# Patient Record
Sex: MALE
[De-identification: names, ages, dates, MRNs, and addresses within clinical notes are randomized per-mention and may not be internally consistent; named-entity substitution may affect disease eponyms.]

---

## 2019-07-09 ENCOUNTER — TRANSCRIPTION ENCOUNTER (OUTPATIENT)
Age: 49
End: 2019-07-09

## 2022-07-12 ENCOUNTER — APPOINTMENT (OUTPATIENT)
Dept: ORTHOPEDIC SURGERY | Facility: CLINIC | Age: 52
End: 2022-07-12

## 2022-07-12 VITALS — WEIGHT: 265 LBS | HEIGHT: 70 IN | BODY MASS INDEX: 37.94 KG/M2

## 2022-07-12 DIAGNOSIS — S62.337D DISPLACED FRACTURE OF NECK OF FIFTH METACARPAL BONE, LEFT HAND, SUBSEQUENT ENCOUNTER FOR FRACTURE WITH ROUTINE HEALING: ICD-10-CM

## 2022-07-12 PROBLEM — Z00.00 ENCOUNTER FOR PREVENTIVE HEALTH EXAMINATION: Status: ACTIVE | Noted: 2022-07-12

## 2022-07-12 PROCEDURE — 99072 ADDL SUPL MATRL&STAF TM PHE: CPT

## 2022-07-12 PROCEDURE — 99213 OFFICE O/P EST LOW 20 MIN: CPT | Mod: PA

## 2022-07-12 NOTE — PHYSICAL EXAM
[de-identified] :   Physical exam of his left hand:  Negative swelling or ecchymosis.  Nontender over the fracture site.  There is a very mild extensor lag.  There is mild depressed knuckle.  There is a mild bony deformity at the fracture site.  He can make a full fist, however he has decreased  strength.  Sensory and motor are intact.

## 2022-07-12 NOTE — WORK
[Moderate Partial] : moderate partial [Does not reveal pre-existing condition(s) that may affect treatment/prognosis] : does not reveal pre-existing condition(s) that may affect treatment/prognosis [Can return to work without limitations on ______] : can return to work without limitations on [unfilled] [Patient] : patient [No Rx restrictions] : No Rx restrictions. [I provided the services listed above] :  I provided the services listed above. [FreeTextEntry1] : good [FreeTextEntry3] : LIAM

## 2022-07-12 NOTE — DISCUSSION/SUMMARY
[de-identified] :   He is 8 weeks status post his injury.\par He may continue to resume normal activities as tolerated.\par The patient understands that fractures take about 6 weeks to heal.  Random residual pain can occur for 6 months to a year.\par I wrote a prescription for occupational/physical therapy for stretching, strengthening, range of motion, and different modalities to help with the pain and inflammation.\par He will follow up p.r.n..\par He is currently working full duty.\par All questions were answered today.

## 2022-07-12 NOTE — HISTORY OF PRESENT ILLNESS
[de-identified] : Patient is a 52-year-old male here for repeat evaluation of his left hand.  He is currently working full duty.  He is 2 months status a 5th metacarpal neck fracture that occurred at work.  He still complains of some discomfort.

## 2022-07-18 ENCOUNTER — FORM ENCOUNTER (OUTPATIENT)
Age: 52
End: 2022-07-18